# Patient Record
Sex: MALE | Race: WHITE
[De-identification: names, ages, dates, MRNs, and addresses within clinical notes are randomized per-mention and may not be internally consistent; named-entity substitution may affect disease eponyms.]

---

## 2020-09-10 ENCOUNTER — HOSPITAL ENCOUNTER (EMERGENCY)
Dept: HOSPITAL 65 - ER | Age: 30
Discharge: HOME | End: 2020-09-10
Payer: COMMERCIAL

## 2020-09-10 VITALS — DIASTOLIC BLOOD PRESSURE: 72 MMHG | SYSTOLIC BLOOD PRESSURE: 134 MMHG

## 2020-09-10 VITALS — HEIGHT: 75 IN | BODY MASS INDEX: 27.85 KG/M2 | WEIGHT: 224 LBS

## 2020-09-10 VITALS — SYSTOLIC BLOOD PRESSURE: 132 MMHG | DIASTOLIC BLOOD PRESSURE: 72 MMHG

## 2020-09-10 VITALS — SYSTOLIC BLOOD PRESSURE: 152 MMHG | DIASTOLIC BLOOD PRESSURE: 91 MMHG
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
## 2020-09-10 DIAGNOSIS — S12.9XXA: Primary | ICD-10-CM
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
## 2020-09-10 PROCEDURE — 72125 CT NECK SPINE W/O DYE: CPT

## 2020-09-10 PROCEDURE — 99285 EMERGENCY DEPT VISIT HI MDM: CPT

## 2020-09-10 NOTE — DIREP
This report includes an Addendum and supersedes previous reports for this exam.

 

 

 

PROCEDURE:CT CERVICAL SPINE WITHOUT CONTRAST

 

TECHNIQUE:Axial cuts were obtained through the cervical spine.  The images 

were viewed at bone settings.  Sagittal and coronal reconstructions are 

provided.  

 

COMPARISON:None.

 

INDICATIONS:injury

 

FINDINGS:

ALIGNMENT:Normal.

VERTEBRAE:Normal.

PARASPINAL AREA:Normal.

OTHER:No additional findings.

 

CERVICAL DISC LEVELS

C2-C3:Normal.

C3-C4:Normal.

C4-C5:Normal.

C5-C6:Normal.

C6-C7:Normal, except for endplate osteophytes.

C7-T1:Normal.

 

CONCLUSION:Normal examination.  

 

 

 

Dictated by: Jose Grossman M.D. on 09/10/2020 at 09:28 PM     

Electronically Signed By: Jose Grossman M.D. on 09/10/2020 at 09:30 PM   

 

 

ADDENDUM:  Nondisplaced spinous process fracture of C5 noted.

 

 

 

Dictated by: Jose Grossman M.D. on 09/10/2020 at 09:33 PM     

Electronically Signed By: Jose Gorssman M.D. on 09/10/2020 at 09:34 PM

## 2020-09-10 NOTE — ER.PDOC
General


Chief Complaint:  Trauma


Stated Complaint:  INJURED NECK AT WORK


Time seen by MD:  20:55


Source:  patient


Exam Limitations:  no limitations





History of Present Illness


Initial Comments


Patient states he was climbing a windmill tower when his partner above him 

dropped a tool bag that struck him on the head/neck.  He did not lose 

consciousness or fall.  He denies radiation of pain or 

numbness/weakness/tingling.  He has been taking Tylenol for pain without relief.


Timing/Duration:  this morning


Severity/Quality:  moderate


Method of Injury:  direct blow


Modifying Factors:  improves with immobilization


Associated Symptoms:  denies symptoms


Allergies:  


Coded Allergies:  


     No Known Allergies (Unverified , 9/10/20)





Past Medical History


Medical History:  no pertinent history


Surgical History:  no surgical history





Family History


Significant Family History:  no pertinent family hx





Social History


Smoking:  non-smoker


Alcohol Use:  occassionally


Drug Use:  none





Review of Systems


Constitutional:  no symptoms reported


EENTM:  no symptoms reported


Respiratory:  no symptoms reported


Cardiovascular:  no symptoms reported


Gastrointestinal:  no symptoms reported


Genitourinary:  no symptoms reported


Musculoskeletal:  see HPI, neck pain


Skin:  no symptoms reported


Psychiatric/Neurological:  no symptoms reported





Physical Exam


General Appearance:  No Apparent Distress, WD/WN


Neck:  Spinous Processes Tender (immediately placed in rigid cervical collar)


Cardiovascular/Respiratory:  Regular Rate, Rhythm, Normal Breath Sounds, No 

Respiratory Distress


Gastrointestinal:  Normal Bowel Sounds, Non Tender


Back:  Normal Inspection, No CVA Tenderness, No Vertebral Tenderness


Extremities:  No Evidence of Injury, Non-Tender


Neuro/Psych:  Alert,  nml/symmetrical, mood/effect nml, No Motor/Sensory 

Deficits, Relexes nml


Skin:  Normal Color





Results/Orders


Results/Orders





Orders - ANGELINA DELGADO DO


Ct Cervical Spine (9/10/20 21:00)





Vital Signs








  Date Time  Temp Pulse Resp B/P (MAP) Pulse Ox O2 Delivery O2 Flow Rate FiO2


 


9/10/20 20:59   16     


 


9/10/20 20:52 98.5 65 16     


 


9/10/20 20:52 98.5 65 16 152/91 (111) 99 Room Air  


 


9/10/20 20:52 98.5 65 16  99   











EKG/XRAY/CT/US


CT Comments:  spinous process fx C5, otherwise normal





Departure


Time of Disposition:  21:34


Disposition:  01 HOME, SELF-CARE


Impression:  


   Primary Impression:  


   Fracture of spinous process of cervical vertebra


Condition:  Stable


Patient Instructions:  Cervical Spine Fracture, Stable


Referrals:  


PCP,UNKNOWN (PCP)


PRIMARY CARE PROVIDER





Additional Instructions:  


Off work 9/11-9/13.  





Alternate Tylenol and Motrin per package instructions every 4 hours as needed 

for pain.  You may augment pain control with mild narcotic as prescribed. 





Ice to neck to help reduce swelling.  





Follow up with your doctor next week for reevaluation.


Duration or Time Spent with Pa:  20 min





Problem Qualifiers








   Primary Impression:  


   Fracture of spinous process of cervical vertebra


   Encounter type:  initial encounter  Fracture type:  closed  Qualified Codes: 

   S12.9XXA - Fracture of neck, unspecified, initial encounter








ANGELINA DELGADO DO            Sep 10, 2020 21:05

## 2021-12-06 ENCOUNTER — HOSPITAL ENCOUNTER (OUTPATIENT)
Dept: HOSPITAL 65 - RT | Age: 31
Discharge: HOME | End: 2021-12-06
Payer: COMMERCIAL

## 2021-12-06 DIAGNOSIS — I10: Primary | ICD-10-CM

## 2021-12-06 PROCEDURE — 93005 ELECTROCARDIOGRAM TRACING: CPT

## 2021-12-06 NOTE — PCM.EKG
[facility]

                                       

Test Date:    2021               Test Time:    13:08:59

Pat Name:     GULSHAN MCGILL            Department:   

Patient ID:   [id number]          Room:          

Gender:       M                        Technician:   NASH

:          1990               Requested By: PHYSICIAN UNDEFINED

Order Number: [id number]           Reading MD:     

                                 Measurements

Intervals                              Axis          

Rate:         73                       P:            55

OR:           175                      QRS:          94

QRSD:         100                      T:            38

QT:           383                                    

QTc:          422                                    

                           Interpretive Statements

Sinus rhythm

Borderline right axis deviation

No previous ECG available for comparison



Please click the below link to view image of tracing.